# Patient Record
Sex: FEMALE | Race: OTHER | URBAN - METROPOLITAN AREA
[De-identification: names, ages, dates, MRNs, and addresses within clinical notes are randomized per-mention and may not be internally consistent; named-entity substitution may affect disease eponyms.]

---

## 2019-11-13 ENCOUNTER — TELEPHONE (OUTPATIENT)
Dept: FAMILY MEDICINE CLINIC | Age: 34
End: 2019-11-13

## 2019-11-13 ENCOUNTER — OFFICE VISIT (OUTPATIENT)
Dept: FAMILY MEDICINE CLINIC | Age: 34
End: 2019-11-13

## 2019-11-13 VITALS
WEIGHT: 172.4 LBS | RESPIRATION RATE: 16 BRPM | SYSTOLIC BLOOD PRESSURE: 107 MMHG | BODY MASS INDEX: 29.43 KG/M2 | DIASTOLIC BLOOD PRESSURE: 70 MMHG | OXYGEN SATURATION: 99 % | HEIGHT: 64 IN | TEMPERATURE: 97.4 F | HEART RATE: 61 BPM

## 2019-11-13 DIAGNOSIS — Z83.49 FAMILY HISTORY OF HYPERTHYROIDISM: ICD-10-CM

## 2019-11-13 DIAGNOSIS — Z00.00 ROUTINE ADULT HEALTH MAINTENANCE: ICD-10-CM

## 2019-11-13 RX ORDER — SERTRALINE HYDROCHLORIDE 50 MG/1
1 TABLET, FILM COATED ORAL DAILY
Refills: 2 | COMMUNITY
Start: 2019-10-04 | End: 2019-11-13 | Stop reason: SDUPTHER

## 2019-11-13 RX ORDER — SERTRALINE HYDROCHLORIDE 50 MG/1
50 TABLET, FILM COATED ORAL DAILY
Qty: 90 TAB | Refills: 3 | Status: SHIPPED | OUTPATIENT
Start: 2019-11-13 | End: 2020-11-17

## 2019-11-13 NOTE — PROGRESS NOTES
Radha Adams is a 29 y.o. female (: 1985) presenting to address:    Chief Complaint   Patient presents with    Establish Care       Vitals:    19 1023   BP: 107/70   Pulse: 61   Resp: 16   Temp: 97.4 °F (36.3 °C)   TempSrc: Oral   SpO2: 99%   Weight: 172 lb 6.4 oz (78.2 kg)   Height: 5' 4\" (1.626 m)   PainSc:   0 - No pain   LMP: 2019       Hearing/Vision:   No exam data present    Learning Assessment:     Learning Assessment 2019   PRIMARY LEARNER Patient   HIGHEST LEVEL OF EDUCATION - PRIMARY LEARNER  4 YEARS OF COLLEGE   BARRIERS PRIMARY LEARNER NONE   CO-LEARNER CAREGIVER No   PRIMARY LANGUAGE ENGLISH   LEARNER PREFERENCE PRIMARY READING   ANSWERED BY self   RELATIONSHIP SELF     Depression Screening:     3 most recent PHQ Screens 2019   PHQ Not Done Active Diagnosis of Depression or Bipolar Disorder   Little interest or pleasure in doing things Not at all   Feeling down, depressed, irritable, or hopeless Not at all   Total Score PHQ 2 0     Fall Risk Assessment:   No flowsheet data found. Abuse Screening:   No flowsheet data found. Coordination of Care Questionaire:   1. Have you been to the ER, urgent care clinic since your last visit? Hospitalized since your last visit? NO    2. Have you seen or consulted any other health care providers outside of the [address] [place] [address] since your last visit? Include any pap smears or colon screening. NO    Advanced Directive:   1. Do you have an Advanced Directive? NO    2. Would you like information on Advanced Directives?  NO

## 2019-11-13 NOTE — PATIENT INSTRUCTIONS
To Do: - finish signing up for [facility] 
- return to the office at your convenience for FASTING (nothing to eat/drink 8-10 hours before, except water) bloodwork; lab opens @ 7:30am M-F. You do NOT need an appointment for the lab, as labs are a \"first-come, first-served\" basis. The lab hours are 7:30am -3:00 pm Mon through Fri, closed from 12noon - 1pm. 
 
 
 
TESTING RESULTS Results will be released to [facility] [address] Normal results will be sent via mail. If you have questions about your results, please schedule a follow up appointment to discuss with your PCP. MEDICATION REFILLS Please allow at least 2 business days for refill requests to be addressed. Medication refills may be requested through your pharmacy. Refills will not be provided by the after hours/on call provider. Well Visit, Ages 25 to 48: Care Instructions Your Care Instructions Physical exams can help you stay healthy. Your doctor has checked your overall health and may have suggested ways to take good care of yourself. He or she also may have recommended tests. At home, you can help prevent illness with healthy eating, regular exercise, and other steps. Follow-up care is a key part of your treatment and safety. Be sure to make and go to all appointments, and call your doctor if you are having problems. It's also a good idea to know your test results and keep a list of the medicines you take. How can you care for yourself at home? · Reach and stay at a healthy weight. This will lower your risk for many problems, such as obesity, diabetes, heart disease, and high blood pressure. · Get at least 30 minutes of physical activity on most days of the week. Walking is a good choice. You also may want to do other activities, such as running, swimming, cycling, or playing tennis or team sports. Discuss any changes in your exercise program with your doctor. · Do not smoke or allow others to smoke around you. If you need help quitting, talk to your doctor about stop-smoking programs and medicines. These can increase your chances of quitting for good. · Talk to your doctor about whether you have any risk factors for sexually transmitted infections (STIs). Having one sex partner (who does not have STIs and does not have sex with anyone else) is a good way to avoid these infections. · Use birth control if you do not want to have children at this time. Talk with your doctor about the choices available and what might be best for you. · Protect your skin from too much sun. When you're outdoors from 10 a.m. to 4 p.m., stay in the shade or cover up with clothing and a hat with a wide brim. Wear sunglasses that block UV rays. Even when it's cloudy, put broad-spectrum sunscreen (SPF 30 or higher) on any exposed skin. · See a dentist one or two times a year for checkups and to have your teeth cleaned. · Wear a seat belt in the car. Follow your doctor's advice about when to have certain tests. These tests can spot problems early. For everyone · Cholesterol. Have the fat (cholesterol) in your blood tested after age 21. Your doctor will tell you how often to have this done based on your age, family history, or other things that can increase your risk for heart disease. · Blood pressure. Have your blood pressure checked during a routine doctor visit. Your doctor will tell you how often to check your blood pressure based on your age, your blood pressure results, and other factors. · Vision. Talk with your doctor about how often to have a glaucoma test. 
· Diabetes. Ask your doctor whether you should have tests for diabetes. · Colon cancer. Your risk for colorectal cancer gets higher as you get older. Some experts say that adults should start regular screening at age 48 and stop at age 76.  Others say to start before age 48 or continue after age 76. Talk with your doctor about your risk and when to start and stop screening. For women · Breast exam and mammogram. Talk to your doctor about when you should have a clinical breast exam and a mammogram. Medical experts differ on whether and how often women under 50 should have these tests. Your doctor can help you decide what is right for you. · Cervical cancer screening test and pelvic exam. Begin with a Pap test at age 24. The test often is part of a pelvic exam. Starting at age 27, you may choose to have a Pap test, an HPV test, or both tests at the same time (called co-testing). Talk with your doctor about how often to have testing. · Tests for sexually transmitted infections (STIs). Ask whether you should have tests for STIs. You may be at risk if you have sex with more than one person, especially if your partners do not wear condoms. For men · Tests for sexually transmitted infections (STIs). Ask whether you should have tests for STIs. You may be at risk if you have sex with more than one person, especially if you do not wear a condom. · Testicular cancer exam. Ask your doctor whether you should check your testicles regularly. · Prostate exam. Talk to your doctor about whether you should have a blood test (called a PSA test) for prostate cancer. Experts differ on whether and when men should have this test. Some experts suggest it if you are older than 39 and are -American or have a father or brother who got prostate cancer when he was younger than 72. When should you call for help? Watch closely for changes in your health, and be sure to contact your doctor if you have any problems or symptoms that concern you. Where can you learn more? Go to http://randi-anabela.info/. Enter P072 in the search box to learn more about \"Well Visit, Ages 25 to 48: Care Instructions. \" Current as of: December 13, 2018 Content Version: 12.2 © 0970-8956 [employer]. Care instructions adapted under license by [employer] (which disclaims liability or warranty for this information). If you have questions about a medical condition or this instruction, always ask your healthcare professional. [address] any warranty or liability for your use of this information.

## 2019-11-13 NOTE — PROGRESS NOTES
[address] [facility]   Primary Care Office Visit - Problem-Oriented    : 1985   Herminia Hernandez is a 29 y.o. female presenting for  Chief Complaint   Patient presents with   [facility] Establish Care            Assessment/Plan:     1. Postpartum depression  Initial encounter. Well controlled. No change to current medication. Did discuss that zoloft is relatively safe for future pregnancies if that is something her & her wife decide to do. - sertraline (ZOLOFT) 50 mg tablet; Take 1 Tab by mouth daily. Dispense: 90 Tab; Refill: 3    2. Routine adult health maintenance  - HEMOGLOBIN A1C WITH EAG; Future  - CBC WITH AUTOMATED DIFF; Future  - METABOLIC PANEL, COMPREHENSIVE; Future  - T4, FREE; Future  - TSH 3RD GENERATION; Future  - LIPID PANEL; Future  Requesting records of las Pap.    3. Family history of hyperthyroidism  - T4, FREE; Future  - TSH 3RD GENERATION; Future      Orders & Diagnoses associated with This Encounter:         ICD-10-CM ICD-9-CM   1. Postpartum depression O99.345 648.44    F53.0 311   2. Routine adult health maintenance Z00.00 V70.0   3. Family history of hyperthyroidism Z83.49 V18.19       Orders Placed This Encounter    HEMOGLOBIN A1C WITH EAG     Standing Status:   Future     Standing Expiration Date:   2020    CBC WITH AUTOMATED DIFF     Standing Status:   Future     Standing Expiration Date:       METABOLIC PANEL, COMPREHENSIVE     Standing Status:   Future     Standing Expiration Date:   2020    T4, FREE     Standing Status:   Future     Standing Expiration Date:   2020    TSH 3RD GENERATION     Standing Status:   Future     Standing Expiration Date:   2020    LIPID PANEL     Standing Status:   Future     Standing Expiration Date:   2020    DISCONTD: sertraline (ZOLOFT) 50 mg tablet     Sig: Take 1 Tab by mouth daily. Refill:  2    sertraline (ZOLOFT) 50 mg tablet     Sig: Take 1 Tab by mouth daily.      Dispense:  90 Tab Refill:  3         This document may have been created with the aid of dictation software. Text may contain errors, particularly phonetic errors. Reviewed management plan & instructions with patient, who voiced understanding. Arlester Shone, MD  Internal Medicine, Family Medicine & Sports Medicine  11/13/2019    Subjective   History:   Hair Doe is a 29 y.o. female presenting to address:  Chief Complaint   Patient presents with   [facility] Establish Care     Overall healthy. Started zoloft 50mg after birth of her daughter, [address]. Would prefer to stay on it. No significant AE.    3 most recent PHQ Screens 11/13/2019   PHQ Not Done Active Diagnosis of Depression or Bipolar Disorder   Little interest or pleasure in doing things Not at all   Feeling down, depressed, irritable, or hopeless Not at all   Total Score PHQ 2 0             Past Medical History:   Diagnosis Date    Degloving injury of hand     left hand    Postpartum depression      Past Surgical History:   Procedure Laterality Date    HX BREAST REDUCTION  2013    HX ORTHOPAEDIC      left hand reconstruction; Dr. Juanjo Beauchamp      reports that she has never smoked. She has never used smokeless tobacco. She reports that she drinks about 2.0 standard drinks of alcohol per week. She reports that she does not use drugs. 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       Medications:     Current Outpatient Medications   Medication Sig  sertraline (ZOLOFT) 50 mg tablet Take 1 Tab by mouth daily. No current facility-administered medications for this visit. Review of Systems:     Review of Systems   Constitutional: Negative for chills and fever. HENT: Negative for ear pain and sore throat. Respiratory: Negative for cough and shortness of breath. Cardiovascular: Negative for chest pain and palpitations. Gastrointestinal: Negative for abdominal pain. Genitourinary: Negative for dysuria. Musculoskeletal: Negative for myalgias. Skin: Negative for rash. Neurological: Negative for speech change, focal weakness and headaches. Endo/Heme/Allergies: Does not bruise/bleed easily. Psychiatric/Behavioral: Negative for depression. The patient is not nervous/anxious and does not have insomnia. Objective   Physical Assessment:   VS:    Vitals:    11/13/19 1023   BP: 107/70   Pulse: 61   Resp: 16   Temp: 97.4 °F (36.3 °C)   TempSrc: Oral   SpO2: 99%   Weight: 172 lb 6.4 oz (78.2 kg)   Height: 5' 4\" (1.626 m)   PainSc:   0 - No pain   LMP: 11/08/2019       Physical Exam   Constitutional: She is oriented to person, place, and time. She appears well-developed and well-nourished. HENT:   Head: Normocephalic and atraumatic. Right Ear: Tympanic membrane, external ear and ear canal normal. No drainage. No middle ear effusion. No decreased hearing is noted. Left Ear: Tympanic membrane, external ear and ear canal normal. No drainage. No middle ear effusion. No decreased hearing is noted. Eyes: EOM are normal.   Neck: Neck supple. No thyromegaly present. Cardiovascular: Normal rate, regular rhythm and intact distal pulses. No murmur heard. Pulmonary/Chest: Effort normal and breath sounds normal. She has no wheezes. She has no rales. Musculoskeletal: Normal range of motion. Neurological: She is alert and oriented to person, place, and time. No cranial nerve deficit.  Coordination normal.   Skin: Skin is warm and dry. She is not diaphoretic. Psychiatric: She has a normal mood and affect. Her behavior is normal. Judgment and thought content normal.   Nursing note reviewed.

## 2020-11-17 ENCOUNTER — APPOINTMENT (OUTPATIENT)
Dept: FAMILY MEDICINE CLINIC | Age: 35
End: 2020-11-17

## 2020-11-17 ENCOUNTER — HOSPITAL ENCOUNTER (OUTPATIENT)
Dept: LAB | Age: 35
Discharge: HOME | End: 2020-11-17
Payer: COMMERCIAL

## 2020-11-17 ENCOUNTER — OFFICE VISIT (OUTPATIENT)
Dept: FAMILY MEDICINE CLINIC | Age: 35
End: 2020-11-17
Payer: COMMERCIAL

## 2020-11-17 VITALS
SYSTOLIC BLOOD PRESSURE: 111 MMHG | WEIGHT: 161 LBS | RESPIRATION RATE: 16 BRPM | TEMPERATURE: 98.2 F | HEART RATE: 56 BPM | HEIGHT: 64 IN | BODY MASS INDEX: 27.49 KG/M2 | OXYGEN SATURATION: 100 % | DIASTOLIC BLOOD PRESSURE: 66 MMHG

## 2020-11-17 DIAGNOSIS — Z87.312 HISTORY OF STRESS FRACTURE: ICD-10-CM

## 2020-11-17 DIAGNOSIS — Z00.00 ROUTINE ADULT HEALTH MAINTENANCE: Primary | ICD-10-CM

## 2020-11-17 DIAGNOSIS — Z00.00 ROUTINE ADULT HEALTH MAINTENANCE: ICD-10-CM

## 2020-11-17 DIAGNOSIS — R51.9 RIGHT-SIDED HEADACHE: ICD-10-CM

## 2020-11-17 DIAGNOSIS — E55.9 VITAMIN D DEFICIENCY: Primary | ICD-10-CM

## 2020-11-17 DIAGNOSIS — Z83.49 FAMILY HISTORY OF HYPERTHYROIDISM: ICD-10-CM

## 2020-11-17 DIAGNOSIS — M79.661 PAIN IN RIGHT LOWER LEG: ICD-10-CM

## 2020-11-17 LAB
25(OH)D3 SERPL-MCNC: 21.2 NG/ML (ref 30–100)
ALBUMIN SERPL-MCNC: 4.3 G/DL (ref 3.4–5)
ALBUMIN/GLOB SERPL: 1.4 {RATIO} (ref 0.8–1.7)
ALP SERPL-CCNC: 55 U/L (ref 45–117)
ALT SERPL-CCNC: 29 U/L (ref 13–56)
ANION GAP SERPL CALC-SCNC: 4 MMOL/L (ref 3–18)
AST SERPL-CCNC: 16 U/L (ref 10–38)
BASOPHILS # BLD: 0 K/UL (ref 0–0.1)
BASOPHILS NFR BLD: 1 % (ref 0–2)
BILIRUB SERPL-MCNC: 0.6 MG/DL (ref 0.2–1)
BUN SERPL-MCNC: 6 MG/DL (ref 7–18)
BUN/CREAT SERPL: 11 (ref 12–20)
CALCIUM SERPL-MCNC: 9.5 MG/DL (ref 8.5–10.1)
CHLORIDE SERPL-SCNC: 107 MMOL/L (ref 100–111)
CHOLEST SERPL-MCNC: 165 MG/DL
CO2 SERPL-SCNC: 29 MMOL/L (ref 21–32)
CREAT SERPL-MCNC: 0.55 MG/DL (ref 0.6–1.3)
DIFFERENTIAL METHOD BLD: ABNORMAL
EOSINOPHIL # BLD: 0 K/UL (ref 0–0.4)
EOSINOPHIL NFR BLD: 1 % (ref 0–5)
ERYTHROCYTE [DISTWIDTH] IN BLOOD BY AUTOMATED COUNT: 13 % (ref 11.6–14.5)
EST. AVERAGE GLUCOSE BLD GHB EST-MCNC: 91 MG/DL
GLOBULIN SER CALC-MCNC: 3 G/DL (ref 2–4)
GLUCOSE SERPL-MCNC: 80 MG/DL (ref 74–99)
HBA1C MFR BLD: 4.8 % (ref 4.2–5.6)
HCT VFR BLD AUTO: 42.9 % (ref 35–45)
HDLC SERPL-MCNC: 59 MG/DL (ref 40–60)
HDLC SERPL: 2.8 {RATIO} (ref 0–5)
HGB BLD-MCNC: 14.4 G/DL (ref 12–16)
LDLC SERPL CALC-MCNC: 94.4 MG/DL (ref 0–100)
LIPID PROFILE,FLP: NORMAL
LYMPHOCYTES # BLD: 1.2 K/UL (ref 0.9–3.6)
LYMPHOCYTES NFR BLD: 29 % (ref 21–52)
MCH RBC QN AUTO: 30.8 PG (ref 24–34)
MCHC RBC AUTO-ENTMCNC: 33.6 G/DL (ref 31–37)
MCV RBC AUTO: 91.7 FL (ref 74–97)
MONOCYTES # BLD: 0.4 K/UL (ref 0.05–1.2)
MONOCYTES NFR BLD: 9 % (ref 3–10)
NEUTS SEG # BLD: 2.5 K/UL (ref 1.8–8)
NEUTS SEG NFR BLD: 60 % (ref 40–73)
PLATELET # BLD AUTO: 244 K/UL (ref 135–420)
PMV BLD AUTO: 11 FL (ref 9.2–11.8)
POTASSIUM SERPL-SCNC: 4.3 MMOL/L (ref 3.5–5.5)
PROT SERPL-MCNC: 7.3 G/DL (ref 6.4–8.2)
RBC # BLD AUTO: 4.68 M/UL (ref 4.2–5.3)
SODIUM SERPL-SCNC: 140 MMOL/L (ref 136–145)
T4 FREE SERPL-MCNC: 0.9 NG/DL (ref 0.7–1.5)
TRIGL SERPL-MCNC: 58 MG/DL (ref ?–150)
TSH SERPL DL<=0.05 MIU/L-ACNC: 1.08 UIU/ML (ref 0.36–3.74)
VLDLC SERPL CALC-MCNC: 11.6 MG/DL
WBC # BLD AUTO: 4 K/UL (ref 4.6–13.2)

## 2020-11-17 PROCEDURE — 83036 HEMOGLOBIN GLYCOSYLATED A1C: CPT

## 2020-11-17 PROCEDURE — 99395 PREV VISIT EST AGE 18-39: CPT | Performed by: FAMILY MEDICINE

## 2020-11-17 PROCEDURE — 80061 LIPID PANEL: CPT

## 2020-11-17 PROCEDURE — 84439 ASSAY OF FREE THYROXINE: CPT

## 2020-11-17 PROCEDURE — 36415 COLL VENOUS BLD VENIPUNCTURE: CPT

## 2020-11-17 PROCEDURE — 84443 ASSAY THYROID STIM HORMONE: CPT

## 2020-11-17 PROCEDURE — 82306 VITAMIN D 25 HYDROXY: CPT

## 2020-11-17 PROCEDURE — 85025 COMPLETE CBC W/AUTO DIFF WBC: CPT

## 2020-11-17 PROCEDURE — 80053 COMPREHEN METABOLIC PANEL: CPT

## 2020-11-17 RX ORDER — ERGOCALCIFEROL 1.25 MG/1
50000 CAPSULE ORAL
Qty: 12 CAP | Refills: 0 | Status: SHIPPED | OUTPATIENT
Start: 2020-11-17 | End: 2021-02-03

## 2020-11-17 NOTE — PROGRESS NOTES
Yaniv Arambula is a 28 y.o. female (: 1985) presenting to address:    Chief Complaint   Patient presents with    Complete Physical     no pap       Vitals:    20 0938   BP: 111/66   Pulse: (!) 56   Resp: 16   Temp: 98.2 °F (36.8 °C)   TempSrc: Temporal   SpO2: 100%   Weight: 161 lb (73 kg)   Height: 5' 4\" (1.626 m)   PainSc:   0 - No pain   LMP: 2020       Hearing/Vision:   No exam data present    Learning Assessment:     Learning Assessment 2019   PRIMARY LEARNER Patient   HIGHEST LEVEL OF EDUCATION - PRIMARY LEARNER  4 YEARS OF COLLEGE   BARRIERS PRIMARY LEARNER NONE   CO-LEARNER CAREGIVER No   PRIMARY LANGUAGE ENGLISH   LEARNER PREFERENCE PRIMARY READING   ANSWERED BY self   RELATIONSHIP SELF     Depression Screening:     3 most recent PHQ Screens 2019   PHQ Not Done Active Diagnosis of Depression or Bipolar Disorder   Little interest or pleasure in doing things Not at all   Feeling down, depressed, irritable, or hopeless Not at all   Total Score PHQ 2 0     Fall Risk Assessment:   No flowsheet data found. Abuse Screening:   No flowsheet data found. Coordination of Care Questionaire:   1. Have you been to the ER, urgent care clinic since your last visit? Hospitalized since your last visit? NO    2. Have you seen or consulted any other health care providers outside of the [address] [place] [address] since your last visit? Include any pap smears or colon screening. NO    Advanced Directive:   1. Do you have an Advanced Directive? NO    2. Would you like information on Advanced Directives?  NO

## 2020-11-17 NOTE — PATIENT INSTRUCTIONS
TESTING RESULTS If you have enEvolvt access: 
? Per federal regulations as of October 20th, 2020, all of your results will be released to you and to your physician / provider simultaneously on [address]   
o This means that it is likely that you will have the opportunity to review your results before your physician / provider! 
? Since all \"critical\" abnormal results are immediately called to the office / on-call providers on nights, weekends and holidays - please refrain from calling after hours when you receive abnormal results through [address] Instead, allow time for your physician / provider to review your results and then provide interpretation and/or guidance. ? If the results are significantly abnormal and require time-sensitive action - guidance will be provided both via [employer] and via telephone. ? For all other results (interpreted as \"normal\", \"abnormal but expected\", \"reassuring / stable\", \"slightly abnormal\") - non-urgent guidance will be provided via enEvolvt communication only. ? [address] [facility] #[phone] If you do NOT have enEvolvt access: 
? If the results are significantly abnormal and require time-sensitive action - guidance will be relayed to you via telephone. ? For all other results (interpreted as \"normal\", \"abnormal but expected\", \"reassuring / stable\", \"slightly abnormal\") - non-urgent guidance will be mailed to you via [email or web address] Results from Outside Facilities / Laboratories: 
Results of tests performed at outside facilities / laboratories likely will not appear in the [facility]. 
o They may appear in the patient portals of those outside facilities / laboratories. o Please keep in mind that with your access to your patient portal directly to an outside facility / laboratory, you are likely viewing results before your physician / provider!  Please allow time for your physician / provider to review your results and then provide interpretation and/or guidance. If you have questions about your results beyond the guidance provided in MyChart or in your results letter, please schedule a follow up appointment to discuss with your physician / provider. MEDICATION REFILLS ? Please request medication refills through your pharmacy, to ensure the correct pharmacy is used. ? Please allow at least 2 business days for refill requests to be addressed. ? Refills will not be provided by the after hours/on call provider. Neck: Exercises Introduction Here are some examples of exercises for you to try. The exercises may be suggested for a condition or for rehabilitation. Start each exercise slowly. Ease off the exercises if you start to have pain. You will be told when to start these exercises and which ones will work best for you. How to do the exercises Neck stretch 1. This stretch works best if you keep your shoulder down as you lean away from it. To help you remember to do this, start by relaxing your shoulders and lightly holding on to your thighs or your chair. 2. Tilt your head toward your shoulder and hold for 15 to 30 seconds. Let the weight of your head stretch your muscles. 3. If you would like a little added stretch, use your hand to gently and steadily pull your head toward your shoulder. For example, keeping your right shoulder down, lean your head to the left. 4. Repeat 2 to 4 times toward each shoulder. Diagonal neck stretch 1. Turn your head slightly toward the direction you will be stretching, and tilt your head diagonally toward your chest and hold for 15 to 30 seconds. 2. If you would like a little added stretch, use your hand to gently and steadily pull your head forward on the diagonal. 
3. Repeat 2 to 4 times toward each side. Dorsal glide stretch The dorsal glide stretches the back of the neck.  If you feel pain, do not glide so far back. Some people find this exercise easier to do while lying on their backs with an ice pack on the neck. 1. Sit or stand tall and look straight ahead. 2. Slowly tuck your chin as you glide your head backward over your body 3. Hold for a count of 6, and then relax for up to 10 seconds. 4. Repeat 8 to 12 times. Chest and shoulder stretch 1. Sit or stand tall and glide your head backward as in the dorsal glide stretch. 2. Raise both arms so that your hands are next to your ears. 3. Take a deep breath, and as you breathe out, lower your elbows down and behind your back. You will feel your shoulder blades slide down and together, and at the same time you will feel a stretch across your chest and the front of your shoulders. 4. Hold for about 6 seconds, and then relax for up to 10 seconds. 5. Repeat 8 to 12 times. Strengthening: Hands on head 1. Move your head backward, forward, and side to side against gentle pressure from your hands, holding each position for about 6 seconds. 2. Repeat 8 to 12 times. Follow-up care is a key part of your treatment and safety. Be sure to make and go to all appointments, and call your doctor if you are having problems. It's also a good idea to know your test results and keep a list of the medicines you take. Where can you learn more? Go to http://www.garza.com/ Enter P975 in the search box to learn more about \"Neck: Exercises. \" Current as of: March 2, 2020               Content Version: 12.6 © 9620-6324 [employer]. Care instructions adapted under license by [employer] (which disclaims liability or warranty for this information). If you have questions about a medical condition or this instruction, always ask your healthcare professional. [address] any warranty or liability for your use of this information. Shin Splints (Shin Pain): Exercises Introduction Here are some examples of exercises for you to try. The exercises may be suggested for a condition or for rehabilitation. Start each exercise slowly. Ease off the exercises if you start to have pain. You will be told when to start these exercises and which ones will work best for you. How to do the exercises Calf wall stretch (back knee straight) 1. Stand facing a wall with your hands on the wall at about eye level. Put your affected leg about a step behind your other leg. 2. Keeping your back leg straight and your back heel on the floor, bend your front knee and gently bring your hip and chest toward the wall until you feel a stretch in the calf of your back leg. 3. Hold the stretch for at least 15 to 30 seconds. 4. Repeat 2 to 4 times. Calf wall stretch (knees bent) 1. Stand facing a wall with your hands on the wall at about eye level. Put your affected leg about a step behind your other leg. 2. Keeping both heels on the floor, bend both knees. Then gently bring your hip and chest toward the wall until you feel a stretch in the calf of your back leg. 3. Hold the stretch for at least 15 to 30 seconds. 4. Repeat 2 to 4 times. Hamstring wall stretch 1. Lie on your back in a doorway, with your good leg through the open door. 2. Slide your affected leg up the wall to straighten your knee. You should feel a gentle stretch down the back of your leg. 3. Hold the stretch for at least 1 minute to begin. Then over time, try to lengthen the time you hold the stretch to as long as 6 minutes. 4. Repeat 2 to 4 times. 5. If you do not have a place to do this exercise in a doorway, there is another way to do it: 6. Lie on your back, and bend the knee of your affected leg. 7. Loop a towel under the ball and toes of that foot, and hold the ends of the towel in your hands. 8. Straighten your knee, and slowly pull back on the towel. You should feel a gentle stretch down the back of your leg. 9. Hold the stretch for 15 to 30 seconds. Or even better, hold the stretch for 1 minute if you can. 10. Repeat 2 to 4 times. 1. Do not arch your back. 2. Do not bend either knee. 3. Keep one heel touching the floor and the other heel touching the wall. Do not point your toes. Shin muscle stretch 1. Sit in a chair, with both feet flat on the floor. 2. Bend your affected leg behind you so that the top of your foot near your toes is flat on the floor and your toes are pointed away from your body. If you need to, you can hold on to the sides of the chair for support. 3. Hold the stretch for at least 15 to 30 seconds. You should feel a stretch in the front (shin) of your lower leg. 4. Repeat 2 to 4 times. Follow-up care is a key part of your treatment and safety. Be sure to make and go to all appointments, and call your doctor if you are having problems. It's also a good idea to know your test results and keep a list of the medicines you take. Where can you learn more? Go to http://www.gray.com/ Enter T400 in the search box to learn more about \"Shin Splints (Shin Pain): Exercises. \" Current as of: March 2, 2020               Content Version: 12.6 © 2006-2020 [employer]. Care instructions adapted under license by [employer] (which disclaims liability or warranty for this information). If you have questions about a medical condition or this instruction, always ask your healthcare professional. [place] [address] any warranty or liability for your use of this information. How to Apply Pressure to Trigger Points Use prolonged finger pressure directly on the point; gradual, steady, penetrating pressure for approximately three minutes is ideal. Each point will feel somewhat different when you press it; some points feel tense, while others are often sore or ache when pressed.  How much pressure to apply to any point depends on how fit you are. A general guideline to follow is that the pressure should be firm enough so that it \"hurts good\" - in other words, something between pleasant, firm pressure and outright pain. The more developed the muscles are, the more pressure you should apply If you feel extreme (or increasing) sensitivity or pain, gradually decrease the pressure until you find a balance between pain and pleasure. Acupressure is not meant to increase your tolerance of pain, so do not think of it as a test of endurance. Do not continue to press a point that is excruciatingly painful. Usually, however, if you firmly hold the point long enough (up to 2 minutes using the middle finger with your index and ring fingers on either side as support), the pain will diminish. Note that sometimes when you hold a point, you'll feel pain in another part of your body This phenomenon is called referred pain and indicates that those areas are related. You should press points in these related areas as well to release blockages. The middle finger is the longest and strongest of your fingers and is best suited for applying self-acupressure. The thumb is strong, too, but often lacks sensitivity If you find that your hand is generally weak or hurt s when you apply finger pressure, you can use the knuckles or your fist or other tools, such as an avocado pit, a golf ball, or a pencil eraser. Although you may be tempted to massage or rub the entire area, it is best just to hold the point steadily with direct finger pressure. The rule of thumb is to apply slow, firm pressure on the point at a 90 degree angle from the surface of the skin. If you are pulling the skin, then the angle of pressure is incorrect. Consciously and gradually direct the pressure into the center of the part of the body you are working on.  It's important to apply and release finger pressure gradually because this allows the tissues time to respond, promoting healing. The better your concentration as you move your fingers slowly into and out of the point, the more effective the treatment will be. After repeated acupressure sessions using different degrees of pressure, you will begin to feel a pulse at the point. This pulsation is a good sign - it means that circulation has increased. Pay attention to the type of pulse you feel. If it's very faint or throbbing, hold the point longer until the pulse balances. If your hand gets tired, slowly withdraw pressure from the point, gently shake out your hand, and take a few deep breaths. When you're ready, go back to the point and gradually apply pressure until you reach the depth where it hurts good. Again, press directly on painful site (which often moves, so follow and stay with it) until you feel a clear, regular pulse or until the pain diminishes. Then slowly decrease the finger pressure, ending with about twenty seconds of light touch. When you have located the point and your fingers are comfortably positioned right on the spot gradually lean your weight toward the point to apply the pressure. If you're pressing a point on your foot, for instance, bend your leg and apply pressure by slowly leaning forward . Using the weight of your upper body (and not just your hands) enables you to apply firm pressure without strain. Direct the pressure perpendicularly to the surface of the skin as you take several long, slow, deep breaths. Hold for a few minutes until you feel a regular pulse or until the soreness at the point decreases. Then gradually release the pressure, finishing with a soothing touch. · [employer] (available on [employer]) · The [employer] Shavon (available at [employer])

## 2020-11-17 NOTE — PROGRESS NOTES
Note to patient:  The purpose of this note is to communicate optimally with the other physicians / APCs involved in your care. It is written using standard medical terminology. If you have questions regarding the details of the note, please contact my office to schedule an appointment to address your questions. [facility]  Primary Care Office Visit - Complete Physical    Yaniv Arambula is a 28 y.o. female presenting for annual physical.  : 1985        Assessment/Plan:       ICD-10-CM ICD-9-CM   1. Routine adult health maintenance  Z00.00 V70.0   2. Family history of hyperthyroidism  Z83.49 V18.19   3. Right-sided headache  R51.9 784.0   4. Pain in right lower leg  M79.661 729.5   5. History of stress fracture  Z87. 312 V13.52       # HM  > labs today      # FHx hyperthyroidism  > labs      # R sided HA  Initial encounter, likely cervicogenic  > start HEP, accupressure, improved ergonomics      # R lower leg pain with hx of stress fx in 2016  Initial encounter, likely MTSS at this point  > check VitD since there is some bony tenderness, given hx  > HEP  > encourage slow increase in mileage  Future considerations: formal PT      Orders Placed This Encounter    HEMOGLOBIN A1C WITH EAG     Standing Status:   Future     Standing Expiration Date:   2021    CBC WITH AUTOMATED DIFF     Standing Status:   Future     Standing Expiration Date:       METABOLIC PANEL, COMPREHENSIVE     Standing Status:   Future     Standing Expiration Date:   2021    T4, FREE     Standing Status:   Future     Standing Expiration Date:   2021    LIPID PANEL     Standing Status:   Future     Standing Expiration Date:   2021    TSH 3RD GENERATION     Standing Status:   Future     Standing Expiration Date:   2021    VITAMIN D, 25 HYDROXY     Standing Status:   Future     Standing Expiration Date:   2021         Management plan & patient instructions reviewed with Tim Proctor, who voiced understanding. This document may have been created with the aid of dictation software. Text may contain errors, particularly phonetic errors. Minoo John MD  Internal Medicine, Family Medicine & Sports Medicine  11/17/2020         Subjective   History:   Tim Proctor is a 28 y.o. female presenting for an annual physical.    Last visit: Nov 2019    Was on zoloft for postpartum depression. Has been off of rx for 8mo. dtr will be turning 1yo soon. Uncertain if they will have more children. [address]irt 89 due to Kyle. On the computer a lot. Recent R sided headache, starts at occiput, then radiates up to top of head. Occurs 2 per week, lasting few days. Hx of migraines as a child. Seems to be improved with icing of neck, etc.  Is RHD    Typically runs for 1/2 year, and then cycles for 1/2 year. Hx of R tibial stress fx in 2016. Recently just started running again. At end of 6mo, plans on 1/2 marathon. No new footwear. Runs in barefoot shoes, which has helped the anterior R lower leg pain in the past.  Noted recently pain after running (max 1mi/day currently), while trying to stretch. No swelling / numbness /tingling. \"definitely not shin splint nor fracture pain\"        LEE 2/7 11/17/2020   Feeling nervous, anxious or on edge? 0   Not being able to stop or control worrying? 0   LEE-2 Subtotal 0   Worrying too much about different things? 0   Trouble relaxing? 0   Being so restless that it is hard to sit still? 0   Becoming easily annoyed or irritable? 0   Feeling afraid as if something awful might happen? 0   LEE-7 Total Score 0   If you checked off any problems, how difficult have these problems made it for you to do your work, take care of thinks at home, or get along with other people?   Not at all difficult       3 most recent PHQ Screens 11/17/2020   PHQ Not Done -   Little interest or pleasure in doing things Not at all   Feeling down, depressed, irritable, or hopeless Not at all   Total Score PHQ 2 0   Trouble falling or staying asleep, or sleeping too much Not at all   Feeling tired or having little energy Not at all   Poor appetite, weight loss, or overeating Not at all   Feeling bad about yourself - or that you are a failure or have let yourself or your family down Not at all   Trouble concentrating on things such as school, work, reading, or watching TV Not at all   Moving or speaking so slowly that other people could have noticed; or the opposite being so fidgety that others notice Not at all   Thoughts of being better off dead, or hurting yourself in some way Not at all   PHQ 9 Score 0   How difficult have these problems made it for you to do your work, take care of your home and get along with others Not difficult at all               Past Medical History:   Diagnosis Date    Degloving injury of hand     left hand    Postpartum depression      Past Surgical History:   Procedure Laterality Date    HX BREAST REDUCTION  2013    HX ORTHOPAEDIC Left     5th finger reconstruction; Dr. Nell Arizmendi      reports that she has never smoked. She has never used smokeless tobacco. She reports current alcohol use of about 2.0 standard drinks of alcohol per week. She reports that she does not use drugs. 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       Medications:     Current Outpatient Medications   Medication Sig    sertraline (ZOLOFT) 50 mg tablet Take 1 Tab by mouth daily. No current facility-administered medications for this visit. Review of Systems:     Review of Systems   Constitutional: Negative for chills and fever. HENT: Negative for ear pain and sore throat. Respiratory: Negative for cough and shortness of breath. Cardiovascular: Negative for chest pain and palpitations. Gastrointestinal: Negative for abdominal pain. Genitourinary: Negative for dysuria. Musculoskeletal: Positive for neck pain. Negative for myalgias. Skin: Negative for rash. Neurological: Positive for headaches. Negative for speech change and focal weakness. Endo/Heme/Allergies: Does not bruise/bleed easily. Psychiatric/Behavioral: Negative for depression. The patient is not nervous/anxious and does not have insomnia. Objective   Physical Assessment:   VS:    Visit Vitals  /66 (BP 1 Location: Right arm, BP Patient Position: Sitting)   Pulse (!) 56   Temp 98.2 °F (36.8 °C) (Temporal)   Resp 16   Ht 5' 4\" (1.626 m)   Wt 161 lb (73 kg)   LMP 11/14/2020   SpO2 100%   Breastfeeding No   BMI 27.64 kg/m²         Physical Exam  Nursing note reviewed. Constitutional:       Appearance: Normal appearance. She is well-developed. She is not diaphoretic. HENT:      Head: Normocephalic and atraumatic. Comments: Mask in place     Right Ear: Tympanic membrane, ear canal and external ear normal.      Left Ear: Tympanic membrane, ear canal and external ear normal.   Neck:      Musculoskeletal: Neck supple. Thyroid: No thyromegaly. Cardiovascular:      Rate and Rhythm: Normal rate and regular rhythm. Heart sounds: No murmur. Pulmonary:      Effort: Pulmonary effort is normal.      Breath sounds: Normal breath sounds. No wheezing or rales. Musculoskeletal: Normal range of motion. Right ankle: She exhibits normal range of motion. Left ankle: She exhibits normal range of motion.       Cervical back: She exhibits tenderness, pain and spasm. She exhibits normal range of motion and no bony tenderness. Back:       Right lower leg: She exhibits tenderness and bony tenderness. Left lower leg: She exhibits no tenderness and no bony tenderness. Legs:       Comments: + TrP R trapezius;  + anterior tib pain on R with resisted active ankle dorsiflexion and some discomfort along tibial spine   Skin:     General: Skin is warm and dry. Neurological:      Mental Status: She is alert and oriented to person, place, and time. Cranial Nerves: No cranial nerve deficit. Coordination: Coordination normal.   Psychiatric:         Behavior: Behavior normal.         Thought Content:  Thought content normal.         Judgment: Judgment normal.